# Patient Record
Sex: FEMALE | Race: WHITE | NOT HISPANIC OR LATINO | Employment: STUDENT | URBAN - METROPOLITAN AREA
[De-identification: names, ages, dates, MRNs, and addresses within clinical notes are randomized per-mention and may not be internally consistent; named-entity substitution may affect disease eponyms.]

---
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
## 2024-03-24 DIAGNOSIS — N93.9 ABNORMAL UTERINE BLEEDING (AUB): ICD-10-CM

## 2024-03-25 RX ORDER — NORETHINDRONE ACETATE AND ETHINYL ESTRADIOL AND FERROUS FUMARATE 1MG-20(21)
1 KIT ORAL DAILY
Qty: 84 TABLET | Refills: 1 | Status: SHIPPED | OUTPATIENT
Start: 2024-03-25

## 2024-05-06 ENCOUNTER — OFFICE VISIT (OUTPATIENT)
Dept: NEPHROLOGY | Facility: CLINIC | Age: 19
End: 2024-05-06
Payer: COMMERCIAL

## 2024-05-06 VITALS
HEIGHT: 65 IN | WEIGHT: 153.22 LBS | HEART RATE: 81 BPM | OXYGEN SATURATION: 99 % | SYSTOLIC BLOOD PRESSURE: 110 MMHG | DIASTOLIC BLOOD PRESSURE: 88 MMHG | BODY MASS INDEX: 25.53 KG/M2

## 2024-05-06 DIAGNOSIS — R80.2 ORTHOSTATIC PROTEINURIA: Primary | ICD-10-CM

## 2024-05-06 LAB
CREAT UR-MCNC: 224.4 MG/DL
PROT UR-MCNC: 157 MG/DL
PROT/CREAT UR: 0.7 MG/G{CREAT} (ref 0–0.1)
SL AMB  POCT GLUCOSE, UA: ABNORMAL
SL AMB LEUKOCYTE ESTERASE,UA: ABNORMAL
SL AMB POCT BILIRUBIN,UA: ABNORMAL
SL AMB POCT BLOOD,UA: ABNORMAL
SL AMB POCT CLARITY,UA: CLEAR
SL AMB POCT COLOR,UA: YELLOW
SL AMB POCT KETONES,UA: ABNORMAL
SL AMB POCT NITRITE,UA: ABNORMAL
SL AMB POCT PH,UA: 6
SL AMB POCT SPECIFIC GRAVITY,UA: 1.02
SL AMB POCT URINE PROTEIN: 300
SL AMB POCT UROBILINOGEN: ABNORMAL

## 2024-05-06 PROCEDURE — 99213 OFFICE O/P EST LOW 20 MIN: CPT | Performed by: PEDIATRICS

## 2024-05-06 PROCEDURE — 82570 ASSAY OF URINE CREATININE: CPT | Performed by: PEDIATRICS

## 2024-05-06 PROCEDURE — 84156 ASSAY OF PROTEIN URINE: CPT | Performed by: PEDIATRICS

## 2024-05-06 PROCEDURE — 81002 URINALYSIS NONAUTO W/O SCOPE: CPT | Performed by: PEDIATRICS

## 2024-05-06 NOTE — PATIENT INSTRUCTIONS
Sending urine from office for quantification.  If within normal limits, no further follow up in nephrology required.  If abnormal, to have first morning urine sample done.  First morning cup and order also provided.

## 2024-05-06 NOTE — PROGRESS NOTES
Pediatric Nephrology Follow Up   Name:Chelsey Navarro    MRN:[id number]    Date:5/6/2024        Assessment/Plan   Assessment:  18 year old female with orthostatic proteinuria here for follow up.     Plan:  Diagnoses and all orders for this visit:    Orthostatic proteinuria  -     POCT urine dip  -     Protein / creatinine ratio, urine  -     Protein / creatinine ratio, urine; Future      Patient Instructions   Sending urine from office for quantification.  If within normal limits, no further follow up in nephrology required.  If abnormal, to have first morning urine sample done.  First morning cup and order also provided.        HPI: Chelsey Navarro is a 18 y.o.female who presents for follow up of   Chief Complaint   Patient presents with    Follow-up   . Chelsey states that she has been doing well overall since her last visit.  Finished up spring semester and is going to continue shadowing as well as take a summer course.  Outside of this, Chelsey denies any changes in her health.  No swelling noted.        Review of Systems  Constitutional:   Negative for fevers, fatigue  HEENT: negative for rhinorrhea, congestion or sore throat  Respiratory: negative for cough   Cardiovascular: negative for facial or lower extremity edema  Gastrointestinal: negative for abdominal pain  Neurologic: negative for headache, dizziness  Hematologic: negative for bruising or bleeding  Integumentary: negative for rashes  Psychiatric/Behavioral: no behavioral changes    The remainder of review of systems as noted per HPI.?          Past Medical History:   Diagnosis Date    Contusion of left great toe without damage to nail     Distal radius fracture     History of ear infections     History of hay fever     Left foot pain     Nondisplaced fracture of distal phalanx of right thumb, initial encounter for closed fracture     Orthostatic proteinuria     Port-wine stain of face     Rash     Rupture of ulnar collateral ligament of thumb, right,  initial encounter     Scoliosis     Shoulder injury     Strain of extensor or abductor muscles, fascia and tendons of right thumb at forearm level, initial encounter     Thumb pain, right     Upper respiratory infection     Vision abnormalities     near sighted    Wrist pain, acute, left      Past Surgical History:   Procedure Laterality Date    FL INJECTION LEFT ELBOW (ARTHROGRAM)  2/22/2021    NO PAST SURGERIES      WISDOM TOOTH EXTRACTION        Family History   Problem Relation Age of Onset    No Known Problems Mother     No Known Problems Father     Hypertension Maternal Grandmother     Hyperlipidemia Maternal Grandmother     Hypertension Maternal Grandfather     Hyperlipidemia Maternal Grandfather     Breast cancer Paternal Grandmother     Pancreatic cancer Paternal Grandfather     No Known Problems Brother      Social History     Socioeconomic History    Marital status: Single     Spouse name: Not on file    Number of children: Not on file    Years of education: Not on file    Highest education level: Not on file   Occupational History    Not on file   Tobacco Use    Smoking status: Never    Smokeless tobacco: Never   Vaping Use    Vaping status: Never Used   Substance and Sexual Activity    Alcohol use: No    Drug use: No    Sexual activity: Yes     Birth control/protection: OCP   Other Topics Concern    Not on file   Social History Narrative    fhx not asked in triage     Social Determinants of Health     Financial Resource Strain: Not on file   Food Insecurity: Not on file   Transportation Needs: Not on file   Physical Activity: Not on file   Stress: Not on file   Social Connections: Not on file   Intimate Partner Violence: Not on file   Housing Stability: Not on file       Allergies   Allergen Reactions    Bactrim [Sulfamethoxazole-Trimethoprim] Anaphylaxis    Clindamycin Hives    Penicillins Hives     Other reaction(s): Other (See Comments)  Unknown    Sulfa Antibiotics Other (See Comments)     "    Current Outpatient Medications:     norethindrone-ethinyl estradiol (Junel FE 1/20) 1-20 MG-MCG per tablet, TAKE ONE TABLET BY MOUTH EVERY DAY, Disp: 84 tablet, Rfl: 1    Pediatric Multiple Vit-C-FA (CHILDRENS CHEWABLE VITAMINS) chewable tablet, Chew, Disp: , Rfl:     tretinoin microspheres (RETIN-A MICRO) 0.04 % gel, , Disp: , Rfl:     ibuprofen (MOTRIN) 400 mg tablet, Take 1 tablet (400 mg total) by mouth every 6 (six) hours as needed for moderate pain or fever (Fever greater than 100.4F) (Patient not taking: Reported on 5/10/2023), Disp: , Rfl: 0    Junel FE 1/20 1-20 MG-MCG per tablet, TAKE ONE TABLET BY MOUTH EVERY DAY (Patient not taking: Reported on 5/10/2023), Disp: 84 tablet, Rfl: 0    ketoconazole (NIZORAL) 2 % shampoo, Apply topically as needed  (Patient not taking: Reported on 5/10/2023), Disp: , Rfl:     lidocaine-prilocaine (EMLA) cream, Apply topically as needed  (Patient not taking: Reported on 5/16/2023), Disp: , Rfl:     triamcinolone (KENALOG) 0.1 % cream, Apply topically 2 (two) times a day (Patient not taking: Reported on 5/10/2023), Disp: 30 g, Rfl: 0     Objective   Vitals:    05/06/24 0852   BP: 110/88   Pulse: 81   SpO2: 99%     Height:5' 5.12\" (1.654 m)  Weight:69.5 kg (153 lb 3.5 oz)  BMI: Body mass index is 25.4 kg/m².     Physical Exam:  General: Awake, alert and in no acute distress  HEENT:  +port wine stain on left face. Normocephalic, atraumatic, pupils equally round and reactive to light, extraocular movement intact, conjunctiva clear with no discharge. Ears normally set with tympanic membranes visualized.  Tympanic membranes without erythema or effusion and canals clear. Nares patent with no discharge.  Mucous membranes moist and oropharynx is clear with no erythema or exudate present.  Normal dentition.  Chest: Normal without deformity  Neck: supple, symmetric with no masses, no cervical lymphadenopathy  Lungs: clear to auscultation bilaterally with no wheezes, rales or " rhonchi.  Cardiovascular:   Normal S1 and S2.  No murmurs, rubs or gallops.  Regular rate and rhythm.  Abdomen:  Soft, nontender, and nondistended.  Normoactive bowel sounds.    Skin: warm and well perfused.  No rashes present.  Extremities:  No cyanosis, clubbing or edema.  Pulses 2+ bilaterally  Musculoskeletal:   Full range of motion all four extremities.  No joint swelling or tenderness noted.  Neurologic: grossly normal neurologic exam with no deficits noted.  Psychiatric: normal mood and affect     Lab Results: none  Imaging: none  Other Studies: urine dip with 3+ protein and spec grav of 1.025, pH 6    All laboratory results and imaging was reviewed by me and summarized above.

## 2024-05-07 ENCOUNTER — TELEPHONE (OUTPATIENT)
Dept: NEPHROLOGY | Facility: CLINIC | Age: 19
End: 2024-05-07

## 2024-05-07 NOTE — TELEPHONE ENCOUNTER
----- Message from Kehinde Price MD sent at 5/7/2024 10:19 AM EDT -----  Please let Chelsey know that she needs to do first morning urine testing as discussed.

## 2024-05-08 ENCOUNTER — APPOINTMENT (OUTPATIENT)
Dept: LAB | Facility: MEDICAL CENTER | Age: 19
End: 2024-05-08
Payer: COMMERCIAL

## 2024-05-08 DIAGNOSIS — R80.2 ORTHOSTATIC PROTEINURIA: ICD-10-CM

## 2024-05-08 LAB
CREAT UR-MCNC: 277.5 MG/DL
PROT UR-MCNC: 15 MG/DL
PROT/CREAT UR: 0.05 MG/G{CREAT} (ref 0–0.1)

## 2024-05-08 PROCEDURE — 82570 ASSAY OF URINE CREATININE: CPT

## 2024-05-08 PROCEDURE — 84156 ASSAY OF PROTEIN URINE: CPT

## 2024-05-09 ENCOUNTER — TELEPHONE (OUTPATIENT)
Dept: NEPHROLOGY | Facility: CLINIC | Age: 19
End: 2024-05-09

## 2024-05-09 NOTE — TELEPHONE ENCOUNTER
----- Message from Kehinde Price MD sent at 5/9/2024 11:17 AM EDT -----  First morning remains normal.  Can transition to PCP for continued monitoring of first morning urine.

## 2024-05-09 NOTE — TELEPHONE ENCOUNTER
Attempted to call Chelsey and notify of results below. No answer, lvm to call us back. Phone number was provided.

## 2024-05-09 NOTE — TELEPHONE ENCOUNTER
Chelsey returned phone call. Notified her of results and recommendations below. Chesley verbalized understanding and had no questions.

## 2024-06-05 NOTE — PROGRESS NOTES
A/p       Abnormal bleeding   Started on OCP and has been on Junel    She reports - that she is ok on it   She reports that she does not get her cycle and that is not a problem to her        Refill x 1 year      2.  Annual    No need for pap till 20       .  19 y/o PO  For annual exam and birth control refill       Review of Systems - History obtained from chart review and the patient  General ROS: negative  Psychological ROS: negative  Ophthalmic ROS: negative  ENT ROS: negative  Allergy and Immunology ROS: negative  Hematological and Lymphatic ROS: negative  Endocrine ROS: negative  Breast ROS: negative for breast lumps  Respiratory ROS: no cough, shortness of breath, or wheezing  Cardiovascular ROS: no chest pain or dyspnea on exertion  Gastrointestinal ROS: no abdominal pain, change in bowel habits, or black or bloody stools  Genito-Urinary ROS: no dysuria, trouble voiding, or hematuria  Musculoskeletal ROS: negative  Neurological ROS: no TIA or stroke symptoms  Dermatological ROS: negative

## 2024-06-07 ENCOUNTER — ANNUAL EXAM (OUTPATIENT)
Dept: OBGYN CLINIC | Facility: MEDICAL CENTER | Age: 19
End: 2024-06-07
Payer: COMMERCIAL

## 2024-06-07 VITALS
HEIGHT: 65 IN | BODY MASS INDEX: 25.08 KG/M2 | WEIGHT: 150.5 LBS | DIASTOLIC BLOOD PRESSURE: 78 MMHG | SYSTOLIC BLOOD PRESSURE: 112 MMHG

## 2024-06-07 DIAGNOSIS — N93.9 ABNORMAL UTERINE BLEEDING (AUB): ICD-10-CM

## 2024-06-07 DIAGNOSIS — Z01.419 WOMEN'S ANNUAL ROUTINE GYNECOLOGICAL EXAMINATION: Primary | ICD-10-CM

## 2024-06-07 PROCEDURE — S0612 ANNUAL GYNECOLOGICAL EXAMINA: HCPCS | Performed by: OBSTETRICS & GYNECOLOGY

## 2024-06-07 RX ORDER — NORETHINDRONE ACETATE AND ETHINYL ESTRADIOL 1MG-20(21)
1 KIT ORAL DAILY
Qty: 84 TABLET | Refills: 3 | Status: SHIPPED | OUTPATIENT
Start: 2024-06-07

## 2025-05-29 ENCOUNTER — TELEPHONE (OUTPATIENT)
Age: 20
End: 2025-05-29

## 2025-05-29 DIAGNOSIS — N93.9 ABNORMAL UTERINE BLEEDING (AUB): ICD-10-CM

## 2025-05-29 RX ORDER — NORETHINDRONE ACETATE AND ETHINYL ESTRADIOL 1MG-20(21)
1 KIT ORAL DAILY
Qty: 84 TABLET | Refills: 3 | Status: SHIPPED | OUTPATIENT
Start: 2025-05-29

## 2025-05-29 NOTE — TELEPHONE ENCOUNTER
"Phone call from patient to schedule New Patient Consult with Nephrology. Patient transitioning from Pediatrics (previous patient of Dr. Busby Andcarl) for Orthostatic Proteinuria.     Patient due for next follow up in Nephrology in the summer. Therefore, patient scheduled for first appointment at [place] in July.     Patient states that \"she would be bring a morning urine to appointments with Pediatric Nephrology, would like to know if she should  bring urine on day of consult with Dr. Reyes[place]\".     Please contact patient to advise.   "

## 2025-06-02 DIAGNOSIS — R80.2 ORTHOSTATIC PROTEINURIA: Primary | ICD-10-CM

## 2025-06-02 NOTE — TELEPHONE ENCOUNTER
LVM to patient let her know that Dr. Reyes stated that she can bring it to the lab. Dr. Reyes put the order in. Advise to please call our office to let us know she received the message and if have any other questions or concerns.

## 2025-06-06 ENCOUNTER — APPOINTMENT (OUTPATIENT)
Dept: LAB | Facility: MEDICAL CENTER | Age: 20
End: 2025-06-06
Payer: COMMERCIAL

## 2025-06-06 DIAGNOSIS — B35.1 ONYCHOMYCOSIS: ICD-10-CM

## 2025-06-06 LAB
ALBUMIN SERPL BCG-MCNC: 4.4 G/DL (ref 3.5–5)
ALP SERPL-CCNC: 38 U/L (ref 34–104)
ALT SERPL W P-5'-P-CCNC: 12 U/L (ref 7–52)
AST SERPL W P-5'-P-CCNC: 17 U/L (ref 13–39)
BILIRUB DIRECT SERPL-MCNC: 0.18 MG/DL (ref 0–0.2)
BILIRUB SERPL-MCNC: 0.96 MG/DL (ref 0.2–1)
PROT SERPL-MCNC: 7.8 G/DL (ref 6.4–8.4)

## 2025-06-06 PROCEDURE — 36415 COLL VENOUS BLD VENIPUNCTURE: CPT

## 2025-06-06 PROCEDURE — 80076 HEPATIC FUNCTION PANEL: CPT

## 2025-07-11 ENCOUNTER — APPOINTMENT (OUTPATIENT)
Dept: LAB | Facility: CLINIC | Age: 20
End: 2025-07-11

## 2025-07-11 ENCOUNTER — OCCMED (OUTPATIENT)
Dept: URGENT CARE | Facility: CLINIC | Age: 20
End: 2025-07-11

## 2025-07-11 DIAGNOSIS — Z02.1 PRE-EMPLOYMENT HEALTH SCREENING EXAMINATION: Primary | ICD-10-CM

## 2025-07-11 DIAGNOSIS — Z02.1 PRE-EMPLOYMENT HEALTH SCREENING EXAMINATION: ICD-10-CM

## 2025-07-11 LAB — RUBV IGG SERPL IA-ACNC: 24.7 IU/ML

## 2025-07-11 PROCEDURE — 86735 MUMPS ANTIBODY: CPT

## 2025-07-11 PROCEDURE — 86762 RUBELLA ANTIBODY: CPT

## 2025-07-11 PROCEDURE — 86480 TB TEST CELL IMMUN MEASURE: CPT

## 2025-07-11 PROCEDURE — 36415 COLL VENOUS BLD VENIPUNCTURE: CPT

## 2025-07-11 PROCEDURE — 86787 VARICELLA-ZOSTER ANTIBODY: CPT

## 2025-07-11 PROCEDURE — 90715 TDAP VACCINE 7 YRS/> IM: CPT

## 2025-07-11 PROCEDURE — 86765 RUBEOLA ANTIBODY: CPT

## 2025-07-12 LAB
GAMMA INTERFERON BACKGROUND BLD IA-ACNC: 0.02 IU/ML
M TB IFN-G BLD-IMP: NEGATIVE
M TB IFN-G CD4+ BCKGRND COR BLD-ACNC: 0.01 IU/ML
M TB IFN-G CD4+ BCKGRND COR BLD-ACNC: 0.02 IU/ML
MEV IGG SER QL IA: 198 AU/ML
MEV IGG SER QL IA: POSITIVE
MITOGEN IGNF BCKGRD COR BLD-ACNC: 9.98 IU/ML
MUV IGG SER QL IA: 86.9 AU/ML
MUV IGG SER QL IA: POSITIVE
VZV IGG SER QL IA: 6.26 S/CO
VZV IGG SER QL IA: POSITIVE

## 2025-07-29 ENCOUNTER — APPOINTMENT (OUTPATIENT)
Dept: LAB | Facility: MEDICAL CENTER | Age: 20
End: 2025-07-29
Attending: STUDENT IN AN ORGANIZED HEALTH CARE EDUCATION/TRAINING PROGRAM
Payer: COMMERCIAL

## 2025-07-29 ENCOUNTER — APPOINTMENT (OUTPATIENT)
Dept: LAB | Facility: HOSPITAL | Age: 20
End: 2025-07-29
Payer: COMMERCIAL

## 2025-07-29 DIAGNOSIS — B35.1 ONYCHOMYCOSIS: ICD-10-CM

## 2025-07-29 DIAGNOSIS — R80.2 ORTHOSTATIC PROTEINURIA: ICD-10-CM

## 2025-07-29 LAB
ALBUMIN SERPL BCG-MCNC: 4.6 G/DL (ref 3.5–5)
ALP SERPL-CCNC: 43 U/L (ref 34–104)
ALT SERPL W P-5'-P-CCNC: 13 U/L (ref 7–52)
AST SERPL W P-5'-P-CCNC: 16 U/L (ref 13–39)
BILIRUB DIRECT SERPL-MCNC: 0.06 MG/DL (ref 0–0.2)
BILIRUB SERPL-MCNC: 0.4 MG/DL (ref 0.2–1)
CREAT UR-MCNC: 150.8 MG/DL
MICROALBUMIN UR-MCNC: 14.3 MG/L
MICROALBUMIN/CREAT 24H UR: 9 MG/G CREATININE (ref 0–30)
PROT SERPL-MCNC: 8.2 G/DL (ref 6.4–8.4)

## 2025-07-29 PROCEDURE — 82570 ASSAY OF URINE CREATININE: CPT

## 2025-07-29 PROCEDURE — 36415 COLL VENOUS BLD VENIPUNCTURE: CPT

## 2025-07-29 PROCEDURE — 80076 HEPATIC FUNCTION PANEL: CPT

## 2025-07-29 PROCEDURE — 82043 UR ALBUMIN QUANTITATIVE: CPT

## 2025-07-30 ENCOUNTER — CONSULT (OUTPATIENT)
Dept: NEPHROLOGY | Facility: CLINIC | Age: 20
End: 2025-07-30
Payer: COMMERCIAL

## 2025-07-30 VITALS
OXYGEN SATURATION: 98 % | DIASTOLIC BLOOD PRESSURE: 76 MMHG | SYSTOLIC BLOOD PRESSURE: 110 MMHG | WEIGHT: 138 LBS | HEART RATE: 70 BPM | BODY MASS INDEX: 22.96 KG/M2

## 2025-07-30 DIAGNOSIS — R80.2 ORTHOSTATIC PROTEINURIA: Primary | ICD-10-CM

## 2025-07-30 LAB
COLOR, POC: YELLOW
EXT BLOOD URINE: NEGATIVE
EXT PROTEIN, UA: NEGATIVE

## 2025-07-30 PROCEDURE — 99243 OFF/OP CNSLTJ NEW/EST LOW 30: CPT | Performed by: STUDENT IN AN ORGANIZED HEALTH CARE EDUCATION/TRAINING PROGRAM

## 2025-08-13 ENCOUNTER — APPOINTMENT (OUTPATIENT)
Dept: LAB | Facility: HOSPITAL | Age: 20
End: 2025-08-13

## 2025-08-13 DIAGNOSIS — Z00.8 HEALTH EXAMINATION IN POPULATION SURVEYS: ICD-10-CM

## 2025-08-13 LAB
CHOLEST SERPL-MCNC: 135 MG/DL (ref ?–200)
EST. AVERAGE GLUCOSE BLD GHB EST-MCNC: 117 MG/DL
HBA1C MFR BLD: 5.7 %
HDLC SERPL-MCNC: 47 MG/DL
LDLC SERPL CALC-MCNC: 68 MG/DL (ref 0–100)
NONHDLC SERPL-MCNC: 88 MG/DL
TRIGL SERPL-MCNC: 101 MG/DL (ref ?–150)

## 2025-08-13 PROCEDURE — 80061 LIPID PANEL: CPT

## 2025-08-13 PROCEDURE — 36415 COLL VENOUS BLD VENIPUNCTURE: CPT

## 2025-08-13 PROCEDURE — 83036 HEMOGLOBIN GLYCOSYLATED A1C: CPT
